# Patient Record
Sex: FEMALE | Race: BLACK OR AFRICAN AMERICAN | Employment: FULL TIME | URBAN - METROPOLITAN AREA
[De-identification: names, ages, dates, MRNs, and addresses within clinical notes are randomized per-mention and may not be internally consistent; named-entity substitution may affect disease eponyms.]

---

## 2017-10-07 ENCOUNTER — HOSPITAL ENCOUNTER (EMERGENCY)
Age: 47
Discharge: HOME | End: 2017-10-07
Attending: EMERGENCY MEDICINE | Admitting: EMERGENCY MEDICINE
Payer: COMMERCIAL

## 2017-10-07 VITALS
TEMPERATURE: 98.3 F | DIASTOLIC BLOOD PRESSURE: 86 MMHG | OXYGEN SATURATION: 99 % | WEIGHT: 180 LBS | SYSTOLIC BLOOD PRESSURE: 136 MMHG | HEART RATE: 70 BPM | RESPIRATION RATE: 20 BRPM

## 2017-10-07 DIAGNOSIS — S01.81XA FACIAL LACERATION, INITIAL ENCOUNTER: Primary | ICD-10-CM

## 2017-10-07 PROCEDURE — 99283 EMERGENCY DEPT VISIT LOW MDM: CPT

## 2017-10-07 PROCEDURE — 74011250637 HC RX REV CODE- 250/637: Performed by: PHYSICIAN ASSISTANT

## 2017-10-07 PROCEDURE — 77030018836 HC SOL IRR NACL ICUM -A

## 2017-10-07 PROCEDURE — 75810000293 HC SIMP/SUPERF WND  RPR

## 2017-10-07 PROCEDURE — 77030031132 HC SUT NYL COVD -A

## 2017-10-07 RX ORDER — MAGNESIUM 200 MG
1000 TABLET ORAL DAILY
COMMUNITY
End: 2019-02-07

## 2017-10-07 RX ORDER — IBUPROFEN 600 MG/1
600 TABLET ORAL
Status: COMPLETED | OUTPATIENT
Start: 2017-10-07 | End: 2017-10-07

## 2017-10-07 RX ADMIN — IBUPROFEN 600 MG: 600 TABLET ORAL at 22:33

## 2017-10-07 NOTE — LETTER
[facility] EMERGENCY DEPT 
[facility] access hospital[facility] [address] [phone] Work/School Note Date: 10/7/2017 To Whom It May concern: 
 
Anurag Goode was seen and treated today in the emergency room by the following provider(s): 
Attending Provider: Henna Andino MD 
Physician Assistant: Brooke Ramirez. Anurag Goode may return to work on 10/9/17. Sincerely, 
 
 
 
 
Brooke Ramirez

## 2017-10-07 NOTE — Clinical Note
Follow-up with your primary care physician in 3-5 days for reassessment and removal of sutures. Bring the results from this visit with your for their review.  Return to the ED for any new, worsening, or persistent symptoms

## 2017-10-08 NOTE — DISCHARGE INSTRUCTIONS
Cuts: Care Instructions  Your Care Instructions  A cut can happen anywhere on your body. Stitches, staples, skin adhesives, or pieces of tape called Steri-Strips are sometimes used to keep the edges of a cut together and help it heal. Steri-Strips can be used by themselves or with stitches or staples. Sometimes cuts are left open. If the cut went deep and through the skin, the doctor may have closed the cut in two layers. A deeper layer of stitches brings the deep part of the cut together. These stitches will dissolve and don't need to be removed. The upper layer closure, which could be stitches, staples, Steri-Strips, or adhesive, is what you see on the cut. A cut is often covered by a bandage. The doctor has checked you carefully, but problems can develop later. If you notice any problems or new symptoms, get medical treatment right away. Follow-up care is a key part of your treatment and safety. Be sure to make and go to all appointments, and call your doctor if you are having problems. It's also a good idea to know your test results and keep a list of the medicines you take. How can you care for yourself at home? If a cut is open or closed  · Prop up the sore area on a pillow anytime you sit or lie down during the next 3 days. Try to keep it above the level of your heart. This will help reduce swelling. · Keep the cut dry for the first 24 to 48 hours. After this, you can shower if your doctor okays it. Pat the cut dry. · Don't soak the cut, such as in a bathtub. Your doctor will tell you when it's safe to get the cut wet. · After the first 24 to 48 hours, clean the cut with soap and water 2 times a day unless your doctor gives you different instructions. ¨ Don't use hydrogen peroxide or alcohol, which can slow healing. ¨ You may cover the cut with a thin layer of petroleum jelly and a nonstick bandage.   ¨ If the doctor put a bandage over the cut, put on a new bandage after cleaning the cut or if the bandage gets wet or dirty. · Avoid any activity that could cause your cut to reopen. · Be safe with medicines. Read and follow all instructions on the label. ¨ If the doctor gave you a prescription medicine for pain, take it as prescribed. ¨ If you are not taking a prescription pain medicine, ask your doctor if you can take an over-the-counter medicine. If the cut is closed with stitches, staples, or Steri-Strips  · Follow the above instructions for open or closed cuts. · Do not remove the stitches or staples on your own. Your doctor will tell you when to come back to have the stitches or staples removed. · Leave Steri-Strips on until they fall off. If the cut is closed with a skin adhesive  · Follow the above instructions for open or closed cuts. · Leave the skin adhesive on your skin until it falls off on its own. This may take 5 to 10 days. · Do not scratch, rub, or pick at the adhesive. · Do not put the sticky part of a bandage directly on the adhesive. · Do not put any kind of ointment, cream, or lotion over the area. This can make the adhesive fall off too soon. Do not use hydrogen peroxide or alcohol, which can slow healing. When should you call for help? Call your doctor now or seek immediate medical care if:  · You have new pain, or your pain gets worse. · The skin near the cut is cold or pale or changes color. · You have tingling, weakness, or numbness near the cut. · The cut starts to bleed, and blood soaks through the bandage. Oozing small amounts of blood is normal.  · You have trouble moving the area near the cut. · You have symptoms of infection, such as:  ¨ Increased pain, swelling, warmth, or redness around the cut. ¨ Red streaks leading from the cut. ¨ Pus draining from the cut. ¨ A fever. Watch closely for changes in your health, and be sure to contact your doctor if:  · The cut reopens. · You do not get better as expected. Where can you learn more?   Go to http://nedra-susana.info/. Enter M735 in the search box to learn more about \"Cuts: Care Instructions. \"  Current as of: March 20, 2017  Content Version: 11.3  © 9827-0422 [employer]. Care instructions adapted under license by [employer] (which disclaims liability or warranty for this information). If you have questions about a medical condition or this instruction, always ask your healthcare professional. [place] [address] any warranty or liability for your use of this information.

## 2017-10-08 NOTE — ED PROVIDER NOTES
HPI Comments: 9:54 PM  52 y.o. female who presents to ED C/O R forehead laceration that occurred a few minutes PTA. Pt notes a coffee mug fell from the cabinet and hit her. Denies LOC, dizziness, n/v. Does not take any blood thinners. Tetanus shot UTD. Pt denies any other sxs or complaints. Written by Jacob Steve PA-C      The history is provided by the patient. Past Medical History:   Diagnosis Date    High cholesterol        Past Surgical History:   Procedure Laterality Date    HX CERVICAL DISKECTOMY      HX CYST REMOVAL      left hand    HX HERNIA REPAIR      HX HYSTERECTOMY      HX OTHER SURGICAL      eye stye removal         History reviewed. No pertinent family history. Social History     Social History    Marital status: SINGLE     Spouse name: N/A    Number of children: N/A    Years of education: N/A     Occupational History    Not on file. Social History Main Topics    Smoking status: Current Every Day Smoker    Smokeless tobacco: Not on file    Alcohol use Yes      Comment: occasional    Drug use: Not on file    Sexual activity: Not on file     Other Topics Concern    Not on file     Social History Narrative    No narrative on file         ALLERGIES: Wellbutrin [bupropion]    Review of Systems   Eyes: Negative for pain. Skin: Positive for wound (R eyebrow). Neurological: Negative for syncope, light-headedness and numbness. Hematological: Negative for adenopathy. Does not bruise/bleed easily. Vitals:    10/07/17 2206   BP: 136/86   Pulse: 70   Resp: 20   Temp: 98.3 °F (36.8 °C)   SpO2: 99%   Weight: 81.6 kg (180 lb)            Physical Exam   Constitutional: She appears well-developed and well-nourished. No distress. HENT:   Head: Normocephalic and atraumatic. Head is without raccoon's eyes, without Melchor's sign and without contusion. Right Ear: No hemotympanum. Left Ear: No hemotympanum. Neck: Normal range of motion. Neck supple. Cardiovascular: Normal rate, regular rhythm and normal heart sounds. Exam reveals no gallop and no friction rub. No murmur heard. Pulmonary/Chest: Breath sounds normal. No respiratory distress. She has no wheezes. She has no rales. Neurological: She is alert. She has normal strength. She is not disoriented. No cranial nerve deficit or sensory deficit. GCS eye subscore is 4. GCS verbal subscore is 5. GCS motor subscore is 6. Skin: Skin is warm. No rash noted. She is not diaphoretic. Nursing note and vitals reviewed. [facility]  ED Course       Wound Repair  Date/Time: 10/7/2017 10:01 PM  Performed by: [address] [employer] [facility] provider: Dr. Vani Disla  Preparation: skin prepped with Betadine  Pre-procedure re-eval: Immediately prior to the procedure, the patient was reevaluated and found suitable for the planned procedure and any planned medications. Time out: Immediately prior to the procedure a time out was called to verify the correct patient, procedure, equipment, staff and marking as appropriate. .  Location details: face  Wound length:2.5 cm or less  Anesthesia: local infiltration    Anesthesia:  Local Anesthetic: lidocaine 1% without epinephrine  Anesthetic total: 2 mL  Foreign bodies: no foreign bodies  Irrigation solution: saline  Irrigation method: syringe  Debridement: none  Skin closure: 6-0 nylon  Number of sutures: 6  Technique: simple  Approximation: close  Dressing: antibiotic ointment and 4x4  Patient tolerance: Patient tolerated the procedure well with no immediate complications  My total time at bedside, performing this procedure was 1-15 minutes. RESULTS:    No orders to display       Labs Reviewed - No data to display    No results found for this or any previous visit (from the past 12 hour(s)). IMPRESSION AND MEDICAL DECISION MAKING:  Head injury d/c: No S/S of fracture, intracranial bleeding, or neurologic deficit. Stable for d/c with outpatient follow-up.      CONDITION ON DISCHARGE:  stable    DISCHARGE NOTE:  10:28 PM  Jess Solomon's  results have been reviewed with her. She has been counseled regarding her diagnosis, treatment, and plan. She verbally conveys understanding and agreement of the signs, symptoms, diagnosis, treatment and prognosis and additionally agrees to follow up as discussed. She also agrees with the care-plan and conveys that all of her questions have been answered. I have also provided discharge instructions for her that include: educational information regarding their diagnosis and treatment, and list of reasons why they would want to return to the ED prior to their follow-up appointment, should her condition change. CLINICAL IMPRESSION:    1.  Facial laceration, initial encounter        AFTER VISIT PLAN:    Current Discharge Medication List           Follow-up Information     Follow up With Details Comments Contact Info    [facility] EMERGENCY DEPT  If symptoms worsen [address]    Sara Gómez MD In 4 days  [place]  [phone]             Written by Katharyn Sicard, PA-C

## 2018-09-25 ENCOUNTER — APPOINTMENT (OUTPATIENT)
Dept: CT IMAGING | Age: 48
End: 2018-09-25
Attending: EMERGENCY MEDICINE
Payer: COMMERCIAL

## 2018-09-25 ENCOUNTER — APPOINTMENT (OUTPATIENT)
Dept: GENERAL RADIOLOGY | Age: 48
End: 2018-09-25
Attending: PHYSICIAN ASSISTANT
Payer: COMMERCIAL

## 2018-09-25 ENCOUNTER — HOSPITAL ENCOUNTER (EMERGENCY)
Age: 48
Discharge: HOME | End: 2018-09-26
Attending: EMERGENCY MEDICINE
Payer: COMMERCIAL

## 2018-09-25 DIAGNOSIS — K85.90 ACUTE PANCREATITIS WITHOUT INFECTION OR NECROSIS, UNSPECIFIED PANCREATITIS TYPE: Primary | ICD-10-CM

## 2018-09-25 LAB
ALBUMIN SERPL-MCNC: 3.9 G/DL (ref 3.4–5)
ALBUMIN/GLOB SERPL: 1.2 {RATIO} (ref 0.8–1.7)
ALP SERPL-CCNC: 74 U/L (ref 45–117)
ALT SERPL-CCNC: 18 U/L (ref 13–56)
ANION GAP SERPL CALC-SCNC: 9 MMOL/L (ref 3–18)
AST SERPL-CCNC: 12 U/L (ref 15–37)
BASOPHILS # BLD: 0 K/UL (ref 0–0.1)
BASOPHILS NFR BLD: 0 % (ref 0–2)
BILIRUB DIRECT SERPL-MCNC: 0.1 MG/DL (ref 0–0.2)
BILIRUB SERPL-MCNC: 0.6 MG/DL (ref 0.2–1)
BUN SERPL-MCNC: 16 MG/DL (ref 7–18)
BUN/CREAT SERPL: 15 (ref 12–20)
CALCIUM SERPL-MCNC: 8.8 MG/DL (ref 8.5–10.1)
CHLORIDE SERPL-SCNC: 104 MMOL/L (ref 100–108)
CK MB CFR SERPL CALC: NORMAL % (ref 0–4)
CK MB SERPL-MCNC: <1 NG/ML (ref 5–25)
CK SERPL-CCNC: 119 U/L (ref 26–192)
CO2 SERPL-SCNC: 27 MMOL/L (ref 21–32)
CREAT SERPL-MCNC: 1.06 MG/DL (ref 0.6–1.3)
D DIMER PPP FEU-MCNC: 1.73 UG/ML(FEU)
DIFFERENTIAL METHOD BLD: ABNORMAL
EOSINOPHIL # BLD: 0.3 K/UL (ref 0–0.4)
EOSINOPHIL NFR BLD: 3 % (ref 0–5)
ERYTHROCYTE [DISTWIDTH] IN BLOOD BY AUTOMATED COUNT: 13.5 % (ref 11.6–14.5)
GLOBULIN SER CALC-MCNC: 3.2 G/DL (ref 2–4)
GLUCOSE SERPL-MCNC: 103 MG/DL (ref 74–99)
HCT VFR BLD AUTO: 34 % (ref 35–45)
HGB BLD-MCNC: 11.5 G/DL (ref 12–16)
LIPASE SERPL-CCNC: 5351 U/L (ref 73–393)
LYMPHOCYTES # BLD: 3.5 K/UL (ref 0.9–3.6)
LYMPHOCYTES NFR BLD: 43 % (ref 21–52)
MCH RBC QN AUTO: 30.7 PG (ref 24–34)
MCHC RBC AUTO-ENTMCNC: 33.8 G/DL (ref 31–37)
MCV RBC AUTO: 90.7 FL (ref 74–97)
MONOCYTES # BLD: 0.4 K/UL (ref 0.05–1.2)
MONOCYTES NFR BLD: 5 % (ref 3–10)
NEUTS SEG # BLD: 4 K/UL (ref 1.8–8)
NEUTS SEG NFR BLD: 49 % (ref 40–73)
PLATELET # BLD AUTO: 179 K/UL (ref 135–420)
PMV BLD AUTO: 9.9 FL (ref 9.2–11.8)
POTASSIUM SERPL-SCNC: 3.3 MMOL/L (ref 3.5–5.5)
PROT SERPL-MCNC: 7.1 G/DL (ref 6.4–8.2)
RBC # BLD AUTO: 3.75 M/UL (ref 4.2–5.3)
SODIUM SERPL-SCNC: 140 MMOL/L (ref 136–145)
TROPONIN I SERPL-MCNC: <0.02 NG/ML (ref 0–0.06)
WBC # BLD AUTO: 8.2 K/UL (ref 4.6–13.2)

## 2018-09-25 PROCEDURE — 99284 EMERGENCY DEPT VISIT MOD MDM: CPT

## 2018-09-25 PROCEDURE — 83690 ASSAY OF LIPASE: CPT | Performed by: EMERGENCY MEDICINE

## 2018-09-25 PROCEDURE — 74011250636 HC RX REV CODE- 250/636: Performed by: EMERGENCY MEDICINE

## 2018-09-25 PROCEDURE — 80076 HEPATIC FUNCTION PANEL: CPT | Performed by: EMERGENCY MEDICINE

## 2018-09-25 PROCEDURE — 82553 CREATINE MB FRACTION: CPT

## 2018-09-25 PROCEDURE — 74177 CT ABD & PELVIS W/CONTRAST: CPT

## 2018-09-25 PROCEDURE — 71046 X-RAY EXAM CHEST 2 VIEWS: CPT

## 2018-09-25 PROCEDURE — 85025 COMPLETE CBC W/AUTO DIFF WBC: CPT

## 2018-09-25 PROCEDURE — 74011250637 HC RX REV CODE- 250/637: Performed by: EMERGENCY MEDICINE

## 2018-09-25 PROCEDURE — 74011636320 HC RX REV CODE- 636/320: Performed by: EMERGENCY MEDICINE

## 2018-09-25 PROCEDURE — 93005 ELECTROCARDIOGRAM TRACING: CPT

## 2018-09-25 PROCEDURE — 96374 THER/PROPH/DIAG INJ IV PUSH: CPT

## 2018-09-25 PROCEDURE — 96375 TX/PRO/DX INJ NEW DRUG ADDON: CPT

## 2018-09-25 PROCEDURE — 96361 HYDRATE IV INFUSION ADD-ON: CPT

## 2018-09-25 PROCEDURE — 85379 FIBRIN DEGRADATION QUANT: CPT | Performed by: EMERGENCY MEDICINE

## 2018-09-25 PROCEDURE — 71275 CT ANGIOGRAPHY CHEST: CPT

## 2018-09-25 PROCEDURE — 80048 BASIC METABOLIC PNL TOTAL CA: CPT

## 2018-09-25 RX ORDER — MORPHINE SULFATE 4 MG/ML
4 INJECTION, SOLUTION INTRAMUSCULAR; INTRAVENOUS
Status: COMPLETED | OUTPATIENT
Start: 2018-09-25 | End: 2018-09-25

## 2018-09-25 RX ORDER — ONDANSETRON 2 MG/ML
4 INJECTION INTRAMUSCULAR; INTRAVENOUS
Status: COMPLETED | OUTPATIENT
Start: 2018-09-25 | End: 2018-09-25

## 2018-09-25 RX ORDER — FAMOTIDINE 10 MG/ML
20 INJECTION INTRAVENOUS
Status: COMPLETED | OUTPATIENT
Start: 2018-09-25 | End: 2018-09-25

## 2018-09-25 RX ADMIN — MORPHINE SULFATE 4 MG: 4 INJECTION, SOLUTION INTRAMUSCULAR; INTRAVENOUS at 22:46

## 2018-09-25 RX ADMIN — FAMOTIDINE 20 MG: 10 INJECTION, SOLUTION INTRAVENOUS at 21:11

## 2018-09-25 RX ADMIN — ONDANSETRON 4 MG: 2 INJECTION INTRAMUSCULAR; INTRAVENOUS at 22:45

## 2018-09-25 RX ADMIN — SODIUM CHLORIDE 1000 ML: 900 INJECTION, SOLUTION INTRAVENOUS at 22:45

## 2018-09-25 RX ADMIN — IOPAMIDOL 100 ML: 755 INJECTION, SOLUTION INTRAVENOUS at 23:35

## 2018-09-25 RX ADMIN — ALUMINUM HYDROXIDE AND MAGNESIUM HYDROXIDE 30 ML: 200; 200 SUSPENSION ORAL at 21:11

## 2018-09-26 VITALS
SYSTOLIC BLOOD PRESSURE: 116 MMHG | WEIGHT: 170 LBS | OXYGEN SATURATION: 97 % | HEART RATE: 67 BPM | HEIGHT: 65 IN | TEMPERATURE: 98.1 F | DIASTOLIC BLOOD PRESSURE: 66 MMHG | BODY MASS INDEX: 28.32 KG/M2 | RESPIRATION RATE: 20 BRPM

## 2018-09-26 RX ORDER — ONDANSETRON 4 MG/1
4 TABLET, ORALLY DISINTEGRATING ORAL
Qty: 10 TAB | Refills: 0 | Status: SHIPPED | OUTPATIENT
Start: 2018-09-26

## 2018-09-26 RX ORDER — OXYCODONE HYDROCHLORIDE 5 MG/1
5 TABLET ORAL
Qty: 6 TAB | Refills: 0 | Status: SHIPPED | OUTPATIENT
Start: 2018-09-26 | End: 2019-02-07

## 2018-09-26 NOTE — ED NOTES
Both written and verbal discharge instructions given to pt with verbalized understanding of home care and follow up. Prescription given. Pt has a ride home with her son.

## 2018-09-26 NOTE — ED NOTES
Pt back from CT scan, feeling better after pain medication. Lights dimmed, blankets given. Resting while waiting for CT results.

## 2018-09-26 NOTE — DISCHARGE INSTRUCTIONS
CLEAR LIQUID DIET FOR 24 HOURS, THEN YOU MAY ADVANCE TO LOW FAT DIET BLAND DIET (SEE BELOW). ABSOLUTELY NO ALCOHOL. FOR PAIN TAKE TYLENOL (ACETAMINOPHEN) 1000 MG (USUALLY 2 EXTRA STRENGTH TABLETS) EVERY 6 HOURS AS NEEDED. IF PAIN STILL DOES NOT GO AWAY, THEN TAKE ONE OF THE PRESCRIBED OXYCODONE PILLS. FOLLOW-UP WITH THE GI DOCTOR. IF YOU HAVE NEW OR WORSENING SYMPTOMS, SEVERE PAIN, VOMITING, FEVER, PASSING OUT OR ANY OTHER WORRYING SIGNS THEN RETURN TO THE ER RIGHT AWAY. AS WE DISCUSSED, MANY PEOPLE NEED TO BE ADMITTED TO THE HOSPITAL FOR THIS CONDITION. AS WE DISCUSSED, THERE WERE ABNORMAL FINDINGS ON YOUR CT SCAN. YOU WERE GIVEN A COPY OF THE RESULTS. PLEASE BRING THESE TO YOUR DOCTOR TO DETERMINE WHAT FURTHER TESTING YOU MAY NEED. Pancreatitis: Care Instructions  Your Care Instructions    The pancreas is an organ behind the stomach. It makes hormones and enzymes to help your body digest food. But if these enzymes attack the pancreas, it can get inflamed. This is called pancreatitis. Most cases are caused by gallstones or by heavy alcohol use. If you take care of yourself at home, it will help you get better. It will also help you avoid more problems with your pancreas. Follow-up care is a key part of your treatment and safety. Be sure to make and go to all appointments, and call your doctor if you are having problems. It's also a good idea to know your test results and keep a list of the medicines you take. How can you care for yourself at home? · Drink clear liquids and eat bland foods until you feel better. Rock foods include rice, dry toast, and crackers. They also include bananas and applesauce. · Eat a low-fat diet until your doctor says your pancreas is healed. · Do not drink alcohol. Tell your doctor if you need help to quit. Counseling, support groups, and sometimes medicines can help you stay sober. · Be safe with medicines.  Read and follow all instructions on the label.  ¨ If the doctor gave you a prescription medicine for pain, take it as prescribed. ¨ If you are not taking a prescription pain medicine, ask your doctor if you can take an over-the-counter medicine. · If your doctor prescribed antibiotics, take them as directed. Do not stop taking them just because you feel better. You need to take the full course of antibiotics. · Get extra rest until you feel better. To prevent future problems with your pancreas  · Do not drink alcohol. · Tell your doctors and pharmacist that you've had pancreatitis. They can help you avoid medicines that may cause this problem again. When should you call for help? Call 911 anytime you think you may need emergency care. For example, call if:    · You vomit blood or what looks like coffee grounds.     · Your stools are maroon or very bloody.    Call your doctor now or seek immediate medical care if:    · You have new or worse belly pain.     · Your stools are black and look like tar, or they have streaks of blood.     · You are vomiting.    Watch closely for changes in your health, and be sure to contact your doctor if:    · You do not get better as expected. Where can you learn more? Go to http://nedra-susana.info/. Enter A239 in the search box to learn more about \"Pancreatitis: Care Instructions. \"  Current as of: May 12, 2017  Content Version: 11.7  © 0282-8192 [employer]. Care instructions adapted under license by [employer] (which disclaims liability or warranty for this information). If you have questions about a medical condition or this instruction, always ask your healthcare professional. [place] [address] any warranty or liability for your use of this information.

## 2018-09-26 NOTE — ED PROVIDER NOTES
EMERGENCY DEPARTMENT HISTORY AND PHYSICAL EXAM 
 
8:30 PM 
 
 
Date: 9/25/2018 Patient Name: Emma Steel History of Presenting Illness Chief Complaint Patient presents with  Chest Pain History Provided By: Patient Chief Complaint: Chest pain Duration:  10 hours Timing:  Acute Location: Under left arm, radiating to under left breast 
Quality: Dorsandroe Bue Severity: Moderate Modifying Factors: Experiences pain when breathing. Associated Symptoms: shortness of breath Additional History (Context): Emma Steel is a 50 y.o. female with past medical Hx of asthma and high cholesterol who presents with acute, sharp upper abdominal and lower chest pain of moderate severity with an onset of 10 hours ago. Patient says that pain starts under left arm and radiates to under her left breast. When pain began, patient says she was sitting in her office where she is a [employer] . Associated symptoms include shortness of breath. She says that she experiences pain when breathing. She denies any regular ETOH intake and has not had these symptoms in the past.  She has h/o asthma and HLD, but is not on any statins. No associated fever, vomiting, urinary sx, leg pain/swelling, diarrhea, or constipation. PCP: Bruce Michael MD 
 
Current Facility-Administered Medications Medication Dose Route Frequency Provider Last Rate Last Dose  sodium chloride 0.9 % bolus infusion 1,000 mL  1,000 mL IntraVENous ONCE Doug Avila MD      
 
Current Outpatient Prescriptions Medication Sig Dispense Refill  cyanocobalamin (VITAMIN B-12) 1,000 mcg sublingual tablet Take 1,000 mcg by mouth daily. Past History Past Medical History: 
Past Medical History:  
Diagnosis Date  Asthma  High cholesterol Past Surgical History: 
Past Surgical History:  
Procedure Laterality Date  HX CERVICAL DISKECTOMY  HX CYST REMOVAL    
 left hand  HX HERNIA REPAIR    
  HX HYSTERECTOMY  HX OTHER SURGICAL    
 eye stye removal  
 
 
Family History: 
History reviewed. No pertinent family history. Social History: 
Social History Substance Use Topics  Smoking status: Current Every Day Smoker  Smokeless tobacco: Never Used  Alcohol use Yes Comment: occasional  
 
 
Allergies: Allergies Allergen Reactions  Bupropion Hcl Rash  Wellbutrin [Bupropion] Rash and Itching Review of Systems Review of Systems Constitutional: Negative for chills, fatigue and fever. HENT: Negative for congestion, ear pain, rhinorrhea and sore throat. Eyes: Negative for pain, redness and itching. Respiratory: Positive for shortness of breath. Negative for cough and chest tightness. Cardiovascular: Positive for chest pain. Negative for palpitations and leg swelling. Gastrointestinal: Negative for abdominal pain, diarrhea, nausea and vomiting. Genitourinary: Negative for decreased urine volume, dysuria, flank pain, hematuria and pelvic pain. Musculoskeletal: Negative for arthralgias, back pain, joint swelling and myalgias. Skin: Negative for color change, pallor and rash. Neurological: Negative for dizziness, weakness and headaches. Hematological: Negative for adenopathy. Does not bruise/bleed easily. All other systems reviewed and are negative. Physical Exam  
 
Visit Vitals  /66  Pulse 63  Temp 98.1 °F (36.7 °C)  Resp 14  
 Ht 5' 5\" (1.651 m)  Wt 77.1 kg (170 lb)  SpO2 99%  BMI 28.29 kg/m2 Physical Exam  
Constitutional: No distress. HENT:  
Head: Normocephalic and atraumatic. Mouth/Throat: Oropharynx is clear and moist.  
Eyes: Conjunctivae and EOM are normal. Pupils are equal, round, and reactive to light. Neck: Normal range of motion. Neck supple. Cardiovascular: Normal rate, regular rhythm and normal heart sounds. No murmur heard. Pulmonary/Chest: Effort normal and breath sounds normal. She has no wheezes. She has no rales. Abdominal: Soft. Bowel sounds are normal. She exhibits no distension and no mass. There is tenderness in the epigastric area and left upper quadrant. There is no rigidity, no rebound and no guarding. No mass or bruit Musculoskeletal: Normal range of motion. She exhibits no edema or deformity. Lymphadenopathy:  
  She has no cervical adenopathy. Neurological: She is alert. She exhibits normal muscle tone. Coordination normal.  
Skin: Skin is warm and dry. No rash noted. She is not diaphoretic. No erythema. Psychiatric: She has a normal mood and affect. Her behavior is normal.  
 
 
 
Diagnostic Study Results Labs - Recent Results (from the past 12 hour(s)) CBC WITH AUTOMATED DIFF Collection Time: 09/25/18  8:20 PM  
Result Value Ref Range WBC 8.2 4.6 - 13.2 K/uL  
 RBC 3.75 (L) 4.20 - 5.30 M/uL  
 HGB 11.5 (L) 12.0 - 16.0 g/dL HCT 34.0 (L) 35.0 - 45.0 % MCV 90.7 74.0 - 97.0 FL  
 MCH 30.7 24.0 - 34.0 PG  
 MCHC 33.8 31.0 - 37.0 g/dL  
 RDW 13.5 11.6 - 14.5 % PLATELET 683 204 - 480 K/uL MPV 9.9 9.2 - 11.8 FL  
 NEUTROPHILS 49 40 - 73 % LYMPHOCYTES 43 21 - 52 % MONOCYTES 5 3 - 10 % EOSINOPHILS 3 0 - 5 % BASOPHILS 0 0 - 2 %  
 ABS. NEUTROPHILS 4.0 1.8 - 8.0 K/UL  
 ABS. LYMPHOCYTES 3.5 0.9 - 3.6 K/UL  
 ABS. MONOCYTES 0.4 0.05 - 1.2 K/UL  
 ABS. EOSINOPHILS 0.3 0.0 - 0.4 K/UL  
 ABS. BASOPHILS 0.0 0.0 - 0.1 K/UL  
 DF AUTOMATED METABOLIC PANEL, BASIC Collection Time: 09/25/18  8:20 PM  
Result Value Ref Range Sodium 140 136 - 145 mmol/L Potassium 3.3 (L) 3.5 - 5.5 mmol/L Chloride 104 100 - 108 mmol/L  
 CO2 27 21 - 32 mmol/L Anion gap 9 3.0 - 18 mmol/L Glucose 103 (H) 74 - 99 mg/dL BUN 16 7.0 - 18 MG/DL Creatinine 1.06 0.6 - 1.3 MG/DL  
 BUN/Creatinine ratio 15 12 - 20 GFR est AA >60 >60 ml/min/1.73m2 GFR est non-AA 55 (L) >60 ml/min/1.73m2 Calcium 8.8 8.5 - 10.1 MG/DL  
CARDIAC PANEL,(CK, CKMB & TROPONIN) Collection Time: 09/25/18  8:20 PM  
Result Value Ref Range  26 - 192 U/L  
 CK - MB <1.0 <3.6 ng/ml CK-MB Index  0.0 - 4.0 % CALCULATION NOT PERFORMED WHEN RESULT IS BELOW LINEAR LIMIT Troponin-I, Qt. <0.02 0.00 - 0.06 NG/ML  
D DIMER Collection Time: 09/25/18  8:20 PM  
Result Value Ref Range D DIMER 1.73 (H) <0.46 ug/ml(FEU) LIPASE Collection Time: 09/25/18  8:20 PM  
Result Value Ref Range Lipase 5351 (H) 73 - 393 U/L  
HEPATIC FUNCTION PANEL Collection Time: 09/25/18  8:20 PM  
Result Value Ref Range Protein, total 7.1 6.4 - 8.2 g/dL Albumin 3.9 3.4 - 5.0 g/dL Globulin 3.2 2.0 - 4.0 g/dL A-G Ratio 1.2 0.8 - 1.7 Bilirubin, total 0.6 0.2 - 1.0 MG/DL Bilirubin, direct 0.1 0.0 - 0.2 MG/DL Alk. phosphatase 74 45 - 117 U/L  
 AST (SGOT) 12 (L) 15 - 37 U/L  
 ALT (SGPT) 18 13 - 56 U/L Radiologic Studies -  
CT ABD PELV W CONT Final Result IMPRESSION:  
1. Moderate fat stranding around the pancreatic body and tail without discrete  
drainable collection. Correlate with lipase for possible pancreatitis. 2.  Nonspecific 13 mm right hepatic lobe lesion measuring 67 Hounsfield units. Recommend further characterization with ultrasound. CTA CHEST W OR W WO CONT Final Result IMPRESSION:  
1. No pulmonary artery filling defect to suggest pulmonary embolus. 2.  Focal narrowing of the proximal celiac trunk as it passes under the left  
hemidiaphragm syd. Correlate for symptoms of median arcuate ligament syndrome. 3.  Centrilobular emphysematous changes and biapical subpleural blebs. 4.  Multiple shotty mediastinal nodes and a 13 mm right hilar lymph node,  
nonspecific. XR CHEST PA LAT Final Result IMPRESSION:  
  
No acute finding Medical Decision Making I am the first provider for this patient. I reviewed the vital signs, available nursing notes, past medical history, past surgical history, family history and social history. Vital Signs-Reviewed the patient's vital signs. Pulse Oximetry Analysis -  100% on room air (Normal) EKG: Interpreted by the EP. Time Interpreted: 20:11 Rate: 75 bpm 
 Rhythm: Normal Sinus Rhythm Interpretation: No acute ST changes Records Reviewed: Nursing Notes (Time of Review: 8:30 PM) ED Course: Progress Notes, Reevaluation, and Consults: 
 
1:53 AM Patient's work up is consistent with acute uncomplicated pancreatitis. She is non-toxic appearing with unremarkable labs. No fever or leukocytosis. She has had persistent pain, but is tolerating PO clears. CT does not show any secondary complications or biliary pathology. I advised patient that I recommend hospital admission for acute pancreatitis, but she declines. States she would rather try dietary mgmt and analgesics at home and that she will call gastroenterology and her PCP to arrange follow-up in the office. She understands that pancreatitis can be a serious and even life threatening disease in some instances and agrees that she will return to the hospital if her symptoms get worse or she develops fever or other worrying signs. Son was at bedside as well for this conversation 2:00 AM  I have discussed incidental CT findings with the patient. She does not have any clinical features that would suggest median arcuate ligament syndrome. Denies any chronic or postprandial abd pain or any unintended weight loss. Advised her of incidental nodules. She is provided a copy of her CT reports and told to bring them to her primary care doctor to guide further outpatient evaluation as needed. Diagnosis Clinical Impression: 1. Acute pancreatitis without infection or necrosis, unspecified pancreatitis type Disposition: Discharge Follow-up Information None Patient's Medications Start Taking No medications on file Continue Taking CYANOCOBALAMIN (VITAMIN B-12) 1,000 MCG SUBLINGUAL TABLET    Take 1,000 mcg by mouth daily. These Medications have changed No medications on file Stop Taking No medications on file  
 
_______________________________ Attestations: 
Scribe Attestation China Fraire acting as a scribe for and in the presence of Mani Dawson MD     
September 25, 2018 at 8:30 PM 
    
Provider Attestation:     
I personally performed the services described in the documentation, reviewed the documentation, as recorded by the scribe in my presence, and it accurately and completely records my words and actions. September 25, 2018 at 8:30 PM - Mani Dawson MD   
_______________________________

## 2018-12-04 ENCOUNTER — HOSPITAL ENCOUNTER (EMERGENCY)
Age: 48
Discharge: HOME | End: 2018-12-04
Attending: EMERGENCY MEDICINE
Payer: COMMERCIAL

## 2018-12-04 ENCOUNTER — APPOINTMENT (OUTPATIENT)
Dept: GENERAL RADIOLOGY | Age: 48
End: 2018-12-04
Attending: PHYSICIAN ASSISTANT
Payer: COMMERCIAL

## 2018-12-04 VITALS
RESPIRATION RATE: 16 BRPM | WEIGHT: 169 LBS | DIASTOLIC BLOOD PRESSURE: 68 MMHG | TEMPERATURE: 98.6 F | OXYGEN SATURATION: 99 % | SYSTOLIC BLOOD PRESSURE: 120 MMHG | BODY MASS INDEX: 28.12 KG/M2 | HEART RATE: 80 BPM

## 2018-12-04 DIAGNOSIS — M79.672 LEFT FOOT PAIN: Primary | ICD-10-CM

## 2018-12-04 PROCEDURE — 73630 X-RAY EXAM OF FOOT: CPT

## 2018-12-04 PROCEDURE — 99282 EMERGENCY DEPT VISIT SF MDM: CPT

## 2018-12-04 RX ORDER — NAPROXEN 500 MG/1
500 TABLET ORAL 2 TIMES DAILY WITH MEALS
Qty: 20 TAB | Refills: 0 | Status: SHIPPED | OUTPATIENT
Start: 2018-12-04 | End: 2019-02-07

## 2018-12-05 NOTE — ED TRIAGE NOTES
Left ankle/foot pain for several days, mostly pain begins on the bottom of the foot and radiates to the ankle. Denies trauma.

## 2018-12-05 NOTE — ED PROVIDER NOTES
EMERGENCY DEPARTMENT HISTORY AND PHYSICAL EXAM 
 
Date: 12/4/2018 Patient Name: Donavan Woodall History of Presenting Illness Chief Complaint Patient presents with  Ankle Pain History Provided By: patient Chief Complaint:foot pain Duration: 2-3 days Timing: acute Location throbbing Severity:moderate Modifying Factors: worse with walking Associated Symptoms: none Additional History (Context): Donavan Woodall is a 50 y.o. female with PMH asthma and high cholesterol who presents with complaints of pain along the bottom of the L foot x 2-3 days. Denies any recent injury. Pain is worse with walking. No other complaints. PCP: Sonya Rollins MD 
 
Current Outpatient Medications Medication Sig Dispense Refill  ondansetron (ZOFRAN ODT) 4 mg disintegrating tablet Take 1 Tab by mouth every eight (8) hours as needed for Nausea. 10 Tab 0  
 oxyCODONE IR (ROXICODONE) 5 mg immediate release tablet Take 1 Tab by mouth every six (6) hours as needed for Pain (Severe pain). Max Daily Amount: 20 mg. 6 Tab 0  
 cyanocobalamin (VITAMIN B-12) 1,000 mcg sublingual tablet Take 1,000 mcg by mouth daily. Past History Past Medical History: 
Past Medical History:  
Diagnosis Date  Asthma  High cholesterol Past Surgical History: 
Past Surgical History:  
Procedure Laterality Date  HX CERVICAL DISKECTOMY  HX CYST REMOVAL    
 left hand  HX HERNIA REPAIR    
 HX HYSTERECTOMY  HX OTHER SURGICAL    
 eye stye removal  
 
 
Family History: No family history on file. Social History: 
Social History Tobacco Use  Smoking status: Current Every Day Smoker  Smokeless tobacco: Never Used Substance Use Topics  Alcohol use: Yes Comment: occasional  
 Drug use: Not on file Allergies: Allergies Allergen Reactions  Bupropion Hcl Rash  Wellbutrin [Bupropion] Rash and Itching Review of Systems Review of Systems Constitutional: Negative. Negative for chills and fever. HENT: Negative. Negative for congestion, ear pain and rhinorrhea. Eyes: Negative. Negative for pain and redness. Respiratory: Negative. Negative for cough, shortness of breath, wheezing and stridor. Cardiovascular: Negative. Negative for chest pain and leg swelling. Gastrointestinal: Negative. Negative for abdominal pain, constipation, diarrhea, nausea and vomiting. Genitourinary: Negative. Negative for dysuria and frequency. Musculoskeletal: Positive for arthralgias. Negative for back pain and neck pain. Skin: Negative. Negative for rash and wound. Neurological: Negative. Negative for dizziness, seizures, syncope and headaches. All other systems reviewed and are negative. All Other Systems Negative Physical Exam  
 
Vitals:  
 12/04/18 2015 BP: 120/68 Pulse: 80 Resp: 16 Temp: 98.6 °F (37 °C) SpO2: 99% Weight: 76.7 kg (169 lb) Physical Exam  
Constitutional: She is oriented to person, place, and time. She appears well-developed and well-nourished. No distress. HENT:  
Head: Normocephalic and atraumatic. Eyes: Conjunctivae are normal. Right eye exhibits no discharge. Left eye exhibits no discharge. No scleral icterus. Neck: Normal range of motion. Neck supple. Cardiovascular: Normal rate. Pulmonary/Chest: Effort normal. No stridor. No respiratory distress. Musculoskeletal: Normal range of motion. She exhibits tenderness. She exhibits no edema or deformity. LLE: intact distal pulses, ROM intact, no obvious deformity noted. TTP elicited along the plantar surface of the foot and along the 4th/5th metatarsals Neurological: She is alert and oriented to person, place, and time. Coordination normal.  
Gait is steady. Able to ambulate without difficulty. Skin: Skin is warm and dry. No rash noted. She is not diaphoretic. No erythema. Psychiatric: She has a normal mood and affect. Her behavior is normal. Thought content normal.  
Nursing note and vitals reviewed. Diagnostic Study Results Labs - No results found for this or any previous visit (from the past 12 hour(s)). Radiologic Studies -  
XR FOOT LT MIN 3 V    (Results Pending) DJD without evidence of acute process CT Results  (Last 48 hours) None CXR Results  (Last 48 hours) None Medical Decision Making I am the first provider for this patient. I reviewed the vital signs, available nursing notes, past medical history, past surgical history, family history and social history. Vital Signs-Reviewed the patient's vital signs. Records Reviewed: Belgica Kimble PA-C 8:18 PM  
 
Procedures: 
Procedures Provider Notes (Medical Decision Making): Impression:  Foot pain, plantar fasciitis MED RECONCILIATION: 
No current facility-administered medications for this encounter. Current Outpatient Medications Medication Sig  
 ondansetron (ZOFRAN ODT) 4 mg disintegrating tablet Take 1 Tab by mouth every eight (8) hours as needed for Nausea.  oxyCODONE IR (ROXICODONE) 5 mg immediate release tablet Take 1 Tab by mouth every six (6) hours as needed for Pain (Severe pain). Max Daily Amount: 20 mg.  
 cyanocobalamin (VITAMIN B-12) 1,000 mcg sublingual tablet Take 1,000 mcg by mouth daily. Disposition: D/c 
 
DISCHARGE NOTE:  
Patient is stable for discharge at this time. Rx for naproxen given. Rest and follow-up with PCP this week. Return to the ED immediately for any new or worsening sx. Belgica Kimble PA-C 8:47 PM  
 
 
Diagnosis Clinical Impression: foot pain plantar fasciitis

## 2018-12-05 NOTE — DISCHARGE INSTRUCTIONS
Scout Activation    Thank you for requesting access to Scout. Please follow the instructions below to securely access and download your online medical record. Scout allows you to send messages to your doctor, view your test results, renew your prescriptions, schedule appointments, and more. How Do I Sign Up? 1. In your internet browser, go to [email or web address]  2. Click on the First Time User? Click Here link in the Sign In box. You will be redirect to the New Member Sign Up page. 3. Enter your Scout Access Code exactly as it appears below. You will not need to use this code after youve completed the sign-up process. If you do not sign up before the expiration date, you must request a new code. Scout Access Code: [email or web address]  Expires: 2018  7:17 PM (This is the date your Scout access code will )    4. Enter the last four digits of your Social Security Number (xxxx) and Date of Birth (mm/dd/yyyy) as indicated and click Submit. You will be taken to the next sign-up page. 5. Create a Scout ID. This will be your Scout login ID and cannot be changed, so think of one that is secure and easy to remember. 6. Create a Scout password. You can change your password at any time. 7. Enter your Password Reset Question and Answer. This can be used at a later time if you forget your password. 8. Enter your e-mail address. You will receive e-mail notification when new information is available in [address]. 9. Click Sign Up. You can now view and download portions of your medical record. 10. Click the Download Summary menu link to download a portable copy of your medical information. Additional Information    If you have questions, please visit the Frequently Asked Questions section of the Scout website at [email or web address] [employer] ([email or web address] Remember, Scout is NOT to be used for urgent needs. For medical emergencies, dial 911.      Complete all medications as prescribed. Follow-up with primary care doctor in 1 week. Return to the ED immediately for any new or worsening symptoms.

## 2018-12-05 NOTE — ED NOTES
Levon Dempsey is a 50 y.o. female that was discharged in stable. Pt was accompanied by self. Pt is driving. The patients diagnosis, condition and treatment were explained to  patient and aftercare instructions were given. The patient verbalized understanding. Patient armband removed and shredded.

## 2019-02-07 ENCOUNTER — APPOINTMENT (OUTPATIENT)
Dept: GENERAL RADIOLOGY | Age: 49
End: 2019-02-07
Attending: PHYSICIAN ASSISTANT
Payer: COMMERCIAL

## 2019-02-07 ENCOUNTER — HOSPITAL ENCOUNTER (EMERGENCY)
Age: 49
Discharge: HOME | End: 2019-02-07
Attending: EMERGENCY MEDICINE
Payer: COMMERCIAL

## 2019-02-07 VITALS
BODY MASS INDEX: 27.16 KG/M2 | RESPIRATION RATE: 16 BRPM | HEART RATE: 67 BPM | TEMPERATURE: 98.6 F | DIASTOLIC BLOOD PRESSURE: 65 MMHG | OXYGEN SATURATION: 100 % | SYSTOLIC BLOOD PRESSURE: 111 MMHG | WEIGHT: 169 LBS | HEIGHT: 66 IN

## 2019-02-07 DIAGNOSIS — J30.9 ALLERGIC RHINITIS, UNSPECIFIED SEASONALITY, UNSPECIFIED TRIGGER: ICD-10-CM

## 2019-02-07 DIAGNOSIS — H65.192 OTHER ACUTE NONSUPPURATIVE OTITIS MEDIA OF LEFT EAR, RECURRENCE NOT SPECIFIED: Primary | ICD-10-CM

## 2019-02-07 DIAGNOSIS — R07.89 MUSCULOSKELETAL CHEST PAIN: ICD-10-CM

## 2019-02-07 PROCEDURE — 71046 X-RAY EXAM CHEST 2 VIEWS: CPT

## 2019-02-07 PROCEDURE — 99281 EMR DPT VST MAYX REQ PHY/QHP: CPT

## 2019-02-07 RX ORDER — FLUTICASONE PROPIONATE 50 MCG
2 SPRAY, SUSPENSION (ML) NASAL DAILY
Qty: 1 BOTTLE | Refills: 0 | Status: SHIPPED | OUTPATIENT
Start: 2019-02-07

## 2019-02-07 RX ORDER — OXYMETAZOLINE HCL 0.05 %
2 SPRAY, NON-AEROSOL (ML) NASAL 2 TIMES DAILY
Qty: 5 ML | Refills: 0 | Status: SHIPPED | OUTPATIENT
Start: 2019-02-07 | End: 2019-02-10

## 2019-02-07 RX ORDER — AMOXICILLIN 500 MG/1
500 TABLET, FILM COATED ORAL 3 TIMES DAILY
Qty: 30 TAB | Refills: 0 | Status: SHIPPED | OUTPATIENT
Start: 2019-02-07 | End: 2019-02-17

## 2019-02-07 RX ORDER — BENZONATATE 100 MG/1
100 CAPSULE ORAL
Qty: 30 CAP | Refills: 0 | Status: SHIPPED | OUTPATIENT
Start: 2019-02-07 | End: 2019-02-14

## 2019-02-07 NOTE — ED PROVIDER NOTES
EMERGENCY DEPARTMENT HISTORY AND PHYSICAL EXAM 
 
2:31 PM 
 
 
Date: 2/7/2019 Patient Name: Darra Duverney History of Presenting Illness Chief Complaint Patient presents with  Cough  Rib Pain History Provided By: Patient Chief Complaint: cough, congestion, body aches, chest wall pain with coughing Duration: 2 Weeks Timing:  Acute Location:  
Quality: Aching Severity: 9 out of 10 Modifying Factors: no improvement with OTC meds Associated Symptoms: denies any other associated signs or symptoms Additional History (Context): Darra Duverney is a 50 y.o. female with a pertinent history of HLD, asthma who presents to the emergency department for evaluation of cough, body aches, sinus congestion, and chest wall pain on left with coughing. Pt reports symptoms x 2 weeks. She was seen for possible sinusitis at urgent care over a week ago and diagnosed with possible sinusitis. She states she subsequently improved, but began experiencing symptoms again in the past week. Pt denies any fevers or chills, headache, dizziness or light headedness,SOB, n/v/d/c, abd pain, back pain, dysuria, hematuria, frequency, focal weakness/numbness/tingling, or rash. Patient has no other complaints at this time. PCP:  Sabiha Pierce MD 
 
 
 
Current Outpatient Medications Medication Sig Dispense Refill  amoxicillin 500 mg tab Take 500 mg by mouth three (3) times daily for 10 days. 30 Tab 0  
 benzonatate (TESSALON PERLES) 100 mg capsule Take 1 Cap by mouth three (3) times daily as needed for Cough for up to 7 days. 30 Cap 0  
 oxymetazoline (AFRIN, OXYMETAZOLINE,) 0.05 % nasal spray 2 Sprays by Both Nostrils route two (2) times a day for 3 days. 5 mL 0  
 fluticasone (FLONASE) 50 mcg/actuation nasal spray 2 Sprays by Both Nostrils route daily. 1 Bottle 0  
 ondansetron (ZOFRAN ODT) 4 mg disintegrating tablet Take 1 Tab by mouth every eight (8) hours as needed for Nausea.  10 Tab 0  
 
 
 Past History Past Medical History: 
Past Medical History:  
Diagnosis Date  Asthma  High cholesterol Past Surgical History: 
Past Surgical History:  
Procedure Laterality Date  HX CERVICAL DISKECTOMY  HX CYST REMOVAL    
 left hand  HX HERNIA REPAIR    
 HX HYSTERECTOMY  HX OTHER SURGICAL    
 eye stye removal  
 
 
Family History: 
History reviewed. No pertinent family history. Social History: 
Social History Tobacco Use  Smoking status: Current Every Day Smoker  Smokeless tobacco: Never Used Substance Use Topics  Alcohol use: Yes Comment: occasional  
 Drug use: Not on file Allergies: Allergies Allergen Reactions  Bupropion Hcl Rash  Wellbutrin [Bupropion] Rash and Itching Review of Systems Review of Systems Constitutional: Negative for chills and fever. HENT: Positive for congestion, rhinorrhea and sinus pressure. Negative for sore throat. Respiratory: Positive for cough. Negative for shortness of breath. Cardiovascular: Positive for chest pain. Gastrointestinal: Negative for abdominal pain, blood in stool, constipation, diarrhea, nausea and vomiting. Genitourinary: Negative for dysuria, frequency and hematuria. Musculoskeletal: Positive for myalgias. Negative for back pain. Skin: Negative for rash and wound. Neurological: Negative for dizziness and headaches. All other systems reviewed and are negative. Physical Exam  
 
Visit Vitals /65 (BP 1 Location: Left arm, BP Patient Position: At rest) Pulse 67 Temp 98.6 °F (37 °C) Resp 16 Ht 5' 5.5\" (1.664 m) Wt 76.7 kg (169 lb) SpO2 100% BMI 27.70 kg/m² Physical Exam  
Constitutional: She is oriented to person, place, and time. She appears well-developed and well-nourished. No distress. HENT:  
Head: Normocephalic and atraumatic. Bilateral nasal turbinates are pale and edematous.   Serous effusion noted to right TM. Left TM bulging with purulence and erythema. Unremarkable posterior oropharynx. Eyes: Conjunctivae are normal. Right eye exhibits no discharge. Left eye exhibits no discharge. Neck: Normal range of motion. Neck supple. No thyromegaly present. Cardiovascular: Normal rate, regular rhythm and normal heart sounds. Pulmonary/Chest: Effort normal and breath sounds normal. No respiratory distress. She has no wheezes. She has no rales. She exhibits no tenderness. Lungs CTAB Musculoskeletal: She exhibits no edema or deformity. Lymphadenopathy:  
  She has no cervical adenopathy. Neurological: She is alert and oriented to person, place, and time. She has normal reflexes. Skin: Skin is warm and dry. She is not diaphoretic. Psychiatric: She has a normal mood and affect. Nursing note and vitals reviewed. Diagnostic Study Results Labs - No results found for this or any previous visit (from the past 12 hour(s)). Radiologic Studies - Xr Chest Pa Lat Result Date: 2/7/2019 EXAM: CHEST PA AND LATERAL CLINICAL HISTORY/INDICATION:  SOB , cough, chest congestion, smoker, history of asthma COMPARISON: Chest x-ray September 25, 2018. TECHNIQUE: PA and lateral views FINDINGS:  The cardiac and mediastinal silhouette is normal.  The lungs are clear. Small bulla in the apices. The costophrenic angles are sharply defined. Pulmonary vascularity is normal. Lower anterior cervical spine hardware. IMPRESSION: No acute finding. Tiny apical bulla unchanged. Medical Decision Making I am the first provider for this patient. I reviewed the vital signs, available nursing notes, past medical history, past surgical history, family history and social history. Vital Signs-Reviewed the patient's vital signs. Pulse Oximetry Analysis -  100% on room air (Interpretation) Records Reviewed: Nursing Notes and Old Medical Records (Time of Review: 2:31 PM) ED Course: Progress Notes, Reevaluation, and Consults: 
 
Provider Notes (Medical Decision Making):  
Differential Diagnosis: sinusitis, influenza, URI, pneumonia, asthma exacerbation Plan:Pt presents in NAD, vitals wnl. Exam and HPI c/w allergic rhinitis, and AOM on left. CXR negative. Will treat with abx, afrin, flonase, and tessalon. At this time, patient is stable and appropriate for discharge home. Patient demonstrates understanding of current diagnoses and is in agreement with the treatment plan. They are advised that while the likelihood of serious underlying condition is low at this point given the evaluation performed today, we cannot fully rule it out. They are advised to immediately return with any new symptoms or worsening of current condition. All questions have been answered. Patient is given educational material regarding their diagnoses, including danger symptoms and when to return to the ED. Diagnosis Clinical Impression: 1. Other acute nonsuppurative otitis media of left ear, recurrence not specified 2. Allergic rhinitis, unspecified seasonality, unspecified trigger 3. Musculoskeletal chest pain Disposition: [facility] Follow-up Information Follow up With Specialties Details Why Contact Info Khushboo Adame MD Internal Medicine Call in 2 days  [address] [address] 
[phone] [address] [facility] EMERGENCY DEPT Emergency Medicine Go to As needed, If symptoms worsen [place] [id number] [phone] Medication List  
  
START taking these medications   
amoxicillin 500 mg Tab Take 500 mg by mouth three (3) times daily for 10 days. benzonatate 100 mg capsule Commonly known as:  TESSALON PERLES Take 1 Cap by mouth three (3) times daily as needed for Cough for up to 7 days. fluticasone 50 mcg/actuation nasal spray Commonly known as:  Pricilla Benito 2 Sprays by Both Nostrils route daily. oxymetazoline 0.05 % nasal spray Commonly known as:  AFRIN (OXYMETAZOLINE) 2 Sprays by Both Nostrils route two (2) times a day for 3 days. ASK your doctor about these medications   
ondansetron 4 mg disintegrating tablet Commonly known as:  ZOFRAN ODT Take 1 Tab by mouth every eight (8) hours as needed for Nausea. Where to Get Your Medications Information about where to get these medications is not yet available Ask your nurse or doctor about these medications · amoxicillin 500 mg Tab · benzonatate 100 mg capsule · fluticasone 50 mcg/actuation nasal spray 
· oxymetazoline 0.05 % nasal spray 
  
 
_______________________________

## 2019-02-07 NOTE — ED NOTES
Sharon Joseph is a 50 y.o. female that was discharged in stable condition. The patients diagnosis, condition and treatment were explained to  patient and aftercare instructions were given. The patient verbalized understanding. Patient armband removed and shredded.

## 2019-02-07 NOTE — DISCHARGE INSTRUCTIONS
Patient Education      Please return immediately to the Emergency Room for re-evaluation if you are not improving, develop any new symptoms, or develop worsening of current symptoms! If you have been prescribed a medication and are unable to take this medication for any reason, please return to the Emergency Department for further evaluation! If you have been referred for follow-up to a specialist, but are unable to follow-up and your symptoms are either not improving or are worsening, please return to the Emergency Department for further evaluation! One or more of your blood pressure readings were elevated during this ER visit. Please keep a close watch your blood pressure by checking it regularly and follow-up with your primary doctor for further evaluation and possible treatment. If you are prescribed blood pressure medication, make sure you take it as prescribed. Untreated high blood pressure can lead to many serious health conditions including, but not limited to, stroke, heart failure, kidney failure. Allergies: Care Instructions  Your Care Instructions    Allergies occur when your body's defense system (immune system) overreacts to certain substances. The immune system treats a harmless substance as if it were a harmful germ or virus. Many things can cause this overreaction, including pollens, medicine, food, dust, animal dander, and mold. Allergies can be mild or severe. Mild allergies can be managed with home treatment. But medicine may be needed to prevent problems. Managing your allergies is an important part of staying healthy. Your doctor may suggest that you have allergy testing to help find out what is causing your allergies. When you know what things trigger your symptoms, you can avoid them. This can prevent allergy symptoms and other health problems.   For severe allergies that cause reactions that affect your whole body (anaphylactic reactions), your doctor may prescribe a shot of epinephrine to carry with you in case you have a severe reaction. Learn how to give yourself the shot and keep it with you at all times. Make sure it is not . Follow-up care is a key part of your treatment and safety. Be sure to make and go to all appointments, and call your doctor if you are having problems. It's also a good idea to know your test results and keep a list of the medicines you take. How can you care for yourself at home? · If you have been told by your doctor that dust or dust mites are causing your allergy, decrease the dust around your bed:  ? Wash sheets, pillowcases, and other bedding in hot water every week. ? Use dust-proof covers for pillows, duvets, and mattresses. Avoid plastic covers because they tear easily and do not \"breathe. \" Wash as instructed on the label. ? Do not use any blankets and pillows that you do not need. ? Use blankets that you can wash in your washing machine. ? Consider removing drapes and carpets, which attract and hold dust, from your bedroom. · If you are allergic to house dust and mites, do not use home humidifiers. Your doctor can suggest ways you can control dust and mites. · Look for signs of cockroaches. Cockroaches cause allergic reactions. Use cockroach baits to get rid of them. Then, clean your home well. Cockroaches like areas where grocery bags, newspapers, empty bottles, or cardboard boxes are stored. Do not keep these inside your home, and keep trash and food containers sealed. Seal off any spots where cockroaches might enter your home. · If you are allergic to mold, get rid of furniture, rugs, and drapes that smell musty. Check for mold in the bathroom. · If you are allergic to outdoor pollen or mold spores, use air-conditioning. Change or clean all filters every month. Keep windows closed. · If you are allergic to pollen, stay inside when pollen counts are high.  Use a vacuum  with a HEPA filter or a double-thickness filter at least two times each week. · Stay inside when air pollution is bad. Avoid paint fumes, perfumes, and other strong odors. · Avoid conditions that make your allergies worse. Stay away from smoke. Do not smoke or let anyone else smoke in your house. Do not use fireplaces or wood-burning stoves. · If you are allergic to your pets, change the air filter in your furnace every month. Use high-efficiency filters. · If you are allergic to pet dander, keep pets outside or out of your bedroom. Old carpet and cloth furniture can hold a lot of animal dander. You may need to replace them. When should you call for help? Give an epinephrine shot if:    · You think you are having a severe allergic reaction.     · You have symptoms in more than one body area, such as mild nausea and an itchy mouth.    After giving an epinephrine shot call 911, even if you feel better.   Call 911 if:    · You have symptoms of a severe allergic reaction. These may include:  ? Sudden raised, red areas (hives) all over your body. ? Swelling of the throat, mouth, lips, or tongue. ? Trouble breathing. ? Passing out (losing consciousness). Or you may feel very lightheaded or suddenly feel weak, confused, or restless.     · You have been given an epinephrine shot, even if you feel better.    Call your doctor now or seek immediate medical care if:    · You have symptoms of an allergic reaction, such as:  ? A rash or hives (raised, red areas on the skin). ? Itching. ? Swelling. ? Belly pain, nausea, or vomiting.    Watch closely for changes in your health, and be sure to contact your doctor if:    · You do not get better as expected. Where can you learn more? Go to http://nedra-susana.info/. Enter P138 in the search box to learn more about \"Allergies: Care Instructions. \"  Current as of: June 27, 2018  Content Version: 11.9  © 2255-1557 [employer].  Care instructions adapted under license by [employer] (which disclaims liability or warranty for this information). If you have questions about a medical condition or this instruction, always ask your healthcare professional. [address] any warranty or liability for your use of this information. Patient Education        Using a Nasal Steroid Spray: Care Instructions  Your Care Instructions    Your doctor may suggest using a corticosteroid nasal spray for your allergy symptoms or sinus problems. These sprays reduce the swelling inside the nose and sinuses. Unlike decongestant nasal sprays, steroid sprays won't lead to more swelling when you stop taking them. These sprays start working in a few days, but it may take several weeks before you get the full effect. Most side effects are minor. The most common complaint is a burning feeling in the nose right after the spray is used. Some people get nosebleeds. Follow-up care is a key part of your treatment and safety. Be sure to make and go to all appointments, and call your doctor if you are having problems. It's also a good idea to know your test results and keep a list of the medicines you take. How can you care for yourself at home? Here are some tips for using these sprays:  · You may need to prime the sprayer before you use it. This means spraying it into the air a few times to make sure you get the right amount of medicine. Follow the directions on the label. · Blow your nose before you spray. This will help clear out your nostrils. · Gently sniff the medicine into your nose as you spray. Don't snort, or the medicine will go all the way into your throat where it won't do much good. · Aim the nozzle straight toward the outer wall of your nostril. This will help keep the medicine from irritating the inner walls of your nose, especially your septum (the wall that separates your left and right nostrils). · Don't blow your nose for 10 minutes or so after you spray.  And try not to sneeze. · Be safe with medicines. Use this medicine exactly as prescribed. Call your doctor if you think you are having a problem with your medicine. · Clean your sprayer once a week. Read the label to learn how. When should you call for help? Watch closely for changes in your health, and be sure to contact your doctor if you have any problems. Where can you learn more? Go to http://nedra-susana.info/. Enter N339 in the search box to learn more about \"Using a Nasal Steroid Spray: Care Instructions. \"  Current as of: March 27, 2018  Content Version: 11.9  © 0345-1170 [employer]. Care instructions adapted under license by [employer] (which disclaims liability or warranty for this information). If you have questions about a medical condition or this instruction, always ask your healthcare professional. [address] any warranty or liability for your use of this information. Patient Education        Musculoskeletal Chest Pain: Care Instructions  Your Care Instructions    Chest pain is not always a sign that something is wrong with your heart or that you have another serious problem. The doctor thinks your chest pain is caused by strained muscles or ligaments, inflamed chest cartilage, or another problem in your chest, rather than by your heart. You may need more tests to find the cause of your chest pain. Follow-up care is a key part of your treatment and safety. Be sure to make and go to all appointments, and call your doctor if you are having problems. It's also a good idea to know your test results and keep a list of the medicines you take. How can you care for yourself at home? · Take pain medicines exactly as directed. ? If the doctor gave you a prescription medicine for pain, take it as prescribed. ? If you are not taking a prescription pain medicine, ask your doctor if you can take an over-the-counter medicine.   · Rest and protect the sore area. · Stop, change, or take a break from any activity that may be causing your pain or soreness. · Put ice or a cold pack on the sore area for 10 to 20 minutes at a time. Try to do this every 1 to 2 hours for the next 3 days (when you are awake) or until the swelling goes down. Put a thin cloth between the ice and your skin. · After 2 or 3 days, apply a heating pad set on low or a warm cloth to the area that hurts. Some doctors suggest that you go back and forth between hot and cold. · Do not wrap or tape your ribs for support. This may cause you to take smaller breaths, which could increase your risk of lung problems. · Mentholated creams such as Bengay or Icy Hot may soothe sore muscles. Follow the instructions on the package. · Follow your doctor's instructions for exercising. · Gentle stretching and massage may help you get better faster. Stretch slowly to the point just before pain begins, and hold the stretch for at least 15 to 30 seconds. Do this 3 or 4 times a day. Stretch just after you have applied heat. · As your pain gets better, slowly return to your normal activities. Any increased pain may be a sign that you need to rest a while longer. When should you call for help? Call 911 anytime you think you may need emergency care. For example, call if:    · You have chest pain or pressure. This may occur with:  ? Sweating. ? Shortness of breath. ? Nausea or vomiting. ? Pain that spreads from the chest to the neck, jaw, or one or both shoulders or arms. ? Dizziness or lightheadedness. ? A fast or uneven pulse. After calling 911, chew 1 adult-strength aspirin. Wait for an ambulance.  Do not try to drive yourself.     · You have sudden chest pain and shortness of breath, or you cough up blood.    Call your doctor now or seek immediate medical care if:    · You have any trouble breathing.     · Your chest pain gets worse.     · Your chest pain occurs consistently with exercise and is relieved by rest.    Watch closely for changes in your health, and be sure to contact your doctor if:    · Your chest pain does not get better after 1 week. Where can you learn more? Go to http://nedra-susana.info/. Enter V293 in the search box to learn more about \"Musculoskeletal Chest Pain: Care Instructions. \"  Current as of: September 23, 2018  Content Version: 11.9  © 4339-7445 [employer]. Care instructions adapted under license by [employer] (which disclaims liability or warranty for this information). If you have questions about a medical condition or this instruction, always ask your healthcare professional. [address] any warranty or liability for your use of this information.

## 2019-02-07 NOTE — LETTER
NOTIFICATION OF RETURN TO WORK 
 
2/7/2019 2:27 PM 
 
Ms. Rivka Quiñones [address] [facility] [address] Kellie Maldonado To Whom It May Concern: 
 
Rivka Quiñones was under the care of [id number] [facility] EMERGENCY DEPT. She will be able to return to work on Monday, 2/11/19. If there are questions or concerns please have the patient contact our office. Sincerely, Francheska Lucas PA-C

## 2019-02-07 NOTE — ED TRIAGE NOTES
Patient states being recently dx with Sinus infection. She states developing cough and left lateral rib pain two days ago.

## 2021-11-01 ENCOUNTER — HOSPITAL ENCOUNTER (EMERGENCY)
Age: 51
Discharge: HOME | End: 2021-11-01
Attending: STUDENT IN AN ORGANIZED HEALTH CARE EDUCATION/TRAINING PROGRAM
Payer: COMMERCIAL

## 2021-11-01 VITALS
HEART RATE: 114 BPM | TEMPERATURE: 97.9 F | DIASTOLIC BLOOD PRESSURE: 74 MMHG | HEIGHT: 65 IN | OXYGEN SATURATION: 99 % | WEIGHT: 135 LBS | BODY MASS INDEX: 22.49 KG/M2 | RESPIRATION RATE: 18 BRPM | SYSTOLIC BLOOD PRESSURE: 100 MMHG

## 2021-11-01 DIAGNOSIS — R53.83 MALAISE AND FATIGUE: ICD-10-CM

## 2021-11-01 DIAGNOSIS — R10.84 ABDOMINAL PAIN, GENERALIZED: Primary | ICD-10-CM

## 2021-11-01 DIAGNOSIS — E86.0 DEHYDRATION: ICD-10-CM

## 2021-11-01 DIAGNOSIS — E87.1 HYPONATREMIA: ICD-10-CM

## 2021-11-01 DIAGNOSIS — R53.81 MALAISE AND FATIGUE: ICD-10-CM

## 2021-11-01 DIAGNOSIS — R53.1 WEAKNESS: ICD-10-CM

## 2021-11-01 LAB
ALBUMIN SERPL-MCNC: 3.1 G/DL (ref 3.4–5)
ALBUMIN/GLOB SERPL: 0.6 {RATIO} (ref 0.8–1.7)
ALP SERPL-CCNC: 271 U/L (ref 45–117)
ALT SERPL-CCNC: 48 U/L (ref 13–56)
ANION GAP SERPL CALC-SCNC: 4 MMOL/L (ref 3–18)
AST SERPL-CCNC: 42 U/L (ref 10–38)
ATRIAL RATE: 99 BPM
BASOPHILS # BLD: 0 K/UL (ref 0–0.1)
BASOPHILS NFR BLD: 0 % (ref 0–2)
BILIRUB SERPL-MCNC: 1.6 MG/DL (ref 0.2–1)
BUN SERPL-MCNC: 28 MG/DL (ref 7–18)
BUN/CREAT SERPL: 20 (ref 12–20)
CALCIUM SERPL-MCNC: 9.5 MG/DL (ref 8.5–10.1)
CALCULATED P AXIS, ECG09: 54 DEGREES
CALCULATED R AXIS, ECG10: 33 DEGREES
CALCULATED T AXIS, ECG11: 49 DEGREES
CHLORIDE SERPL-SCNC: 94 MMOL/L (ref 100–111)
CO2 SERPL-SCNC: 28 MMOL/L (ref 21–32)
CREAT SERPL-MCNC: 1.4 MG/DL (ref 0.6–1.3)
DIAGNOSIS, 93000: NORMAL
DIFFERENTIAL METHOD BLD: ABNORMAL
EOSINOPHIL # BLD: 0.1 K/UL (ref 0–0.4)
EOSINOPHIL NFR BLD: 1 % (ref 0–5)
ERYTHROCYTE [DISTWIDTH] IN BLOOD BY AUTOMATED COUNT: 12.9 % (ref 11.6–14.5)
GLOBULIN SER CALC-MCNC: 4.8 G/DL (ref 2–4)
GLUCOSE SERPL-MCNC: 102 MG/DL (ref 74–99)
HCT VFR BLD AUTO: 32.9 % (ref 35–45)
HGB BLD-MCNC: 11.2 G/DL (ref 12–16)
LIPASE SERPL-CCNC: 126 U/L (ref 73–393)
LYMPHOCYTES # BLD: 1.3 K/UL (ref 0.9–3.6)
LYMPHOCYTES NFR BLD: 14 % (ref 21–52)
MAGNESIUM SERPL-MCNC: 2.5 MG/DL (ref 1.6–2.6)
MCH RBC QN AUTO: 28.7 PG (ref 24–34)
MCHC RBC AUTO-ENTMCNC: 34 G/DL (ref 31–37)
MCV RBC AUTO: 84.4 FL (ref 78–100)
MONOCYTES # BLD: 0.7 K/UL (ref 0.05–1.2)
MONOCYTES NFR BLD: 8 % (ref 3–10)
NEUTS SEG # BLD: 7 K/UL (ref 1.8–8)
NEUTS SEG NFR BLD: 76 % (ref 40–73)
P-R INTERVAL, ECG05: 124 MS
PLATELET # BLD AUTO: 363 K/UL (ref 135–420)
PMV BLD AUTO: 9.9 FL (ref 9.2–11.8)
POTASSIUM SERPL-SCNC: 3.9 MMOL/L (ref 3.5–5.5)
PROT SERPL-MCNC: 7.9 G/DL (ref 6.4–8.2)
Q-T INTERVAL, ECG07: 352 MS
QRS DURATION, ECG06: 84 MS
QTC CALCULATION (BEZET), ECG08: 451 MS
RBC # BLD AUTO: 3.9 M/UL (ref 4.2–5.3)
SODIUM SERPL-SCNC: 126 MMOL/L (ref 136–145)
TROPONIN I SERPL-MCNC: <0.02 NG/ML (ref 0–0.04)
VENTRICULAR RATE, ECG03: 99 BPM
WBC # BLD AUTO: 9.3 K/UL (ref 4.6–13.2)

## 2021-11-01 PROCEDURE — 83735 ASSAY OF MAGNESIUM: CPT

## 2021-11-01 PROCEDURE — 80053 COMPREHEN METABOLIC PANEL: CPT

## 2021-11-01 PROCEDURE — 74011250636 HC RX REV CODE- 250/636: Performed by: STUDENT IN AN ORGANIZED HEALTH CARE EDUCATION/TRAINING PROGRAM

## 2021-11-01 PROCEDURE — 96375 TX/PRO/DX INJ NEW DRUG ADDON: CPT

## 2021-11-01 PROCEDURE — 99283 EMERGENCY DEPT VISIT LOW MDM: CPT

## 2021-11-01 PROCEDURE — 96361 HYDRATE IV INFUSION ADD-ON: CPT

## 2021-11-01 PROCEDURE — 83690 ASSAY OF LIPASE: CPT

## 2021-11-01 PROCEDURE — 96374 THER/PROPH/DIAG INJ IV PUSH: CPT

## 2021-11-01 PROCEDURE — 93005 ELECTROCARDIOGRAM TRACING: CPT

## 2021-11-01 PROCEDURE — 85025 COMPLETE CBC W/AUTO DIFF WBC: CPT

## 2021-11-01 PROCEDURE — 84484 ASSAY OF TROPONIN QUANT: CPT

## 2021-11-01 RX ORDER — ONDANSETRON 4 MG/1
4 TABLET, ORALLY DISINTEGRATING ORAL
Qty: 20 TABLET | Refills: 0 | Status: SHIPPED | OUTPATIENT
Start: 2021-11-01

## 2021-11-01 RX ORDER — ONDANSETRON 2 MG/ML
4 INJECTION INTRAMUSCULAR; INTRAVENOUS
Status: COMPLETED | OUTPATIENT
Start: 2021-11-01 | End: 2021-11-01

## 2021-11-01 RX ORDER — HYDROMORPHONE HYDROCHLORIDE 1 MG/ML
1 INJECTION, SOLUTION INTRAMUSCULAR; INTRAVENOUS; SUBCUTANEOUS ONCE
Status: COMPLETED | OUTPATIENT
Start: 2021-11-01 | End: 2021-11-01

## 2021-11-01 RX ADMIN — HYDROMORPHONE HYDROCHLORIDE 1 MG: 1 INJECTION, SOLUTION INTRAMUSCULAR; INTRAVENOUS; SUBCUTANEOUS at 08:36

## 2021-11-01 RX ADMIN — ONDANSETRON 4 MG: 2 INJECTION INTRAMUSCULAR; INTRAVENOUS at 08:37

## 2021-11-01 RX ADMIN — SODIUM CHLORIDE 1000 ML: 900 INJECTION, SOLUTION INTRAVENOUS at 08:37

## 2021-11-01 RX ADMIN — SODIUM CHLORIDE 1000 ML: 900 INJECTION, SOLUTION INTRAVENOUS at 10:11

## 2021-11-01 NOTE — ED PROVIDER NOTES
EMERGENCY DEPARTMENT HISTORY AND PHYSICAL EXAM      Date: 11/1/2021  Patient Name: Bishop Lou    History of Presenting Illness     Chief Complaint   Patient presents with    Vomiting       History (Context): Bishop Lou is a 46 y.o. female with a past medical history significant for asthma, pancreatic cancer, and hyperlipidemia comes into the ED today due to nausea, vomiting, diarrhea, and abdominal pain. Patient states symptoms have been ongoing for the past few days. Patient states symptoms have worsened since onset. She currently is not on any chemotherapy for treatment of her pancreatic cancer. She admits to multiple episodes of emesis per day and is unable to keep down any fluids or food. She admits to multiple episodes of diarrhea as well over the past day. She denies any fever, chest pain, dyspnea, numbness, tingling, or unilateral weakness. She does admit to fatigue, decreased activity, and chills. She has been taking her at home pain medication for treatment of her symptoms as well as Zofran however without alleviation. She describes her symptoms as severe. PCP: Donald Lewis MD    Current Facility-Administered Medications   Medication Dose Route Frequency Provider Last Rate Last Admin    sodium chloride 0.9 % bolus infusion 1,000 mL  1,000 mL IntraVENous ONCE Mann Fernandez, DO        HYDROmorphone (DILAUDID) injection 1 mg  1 mg IntraVENous ONCE Mann Fernandez DO        ondansetron (ZOFRAN) injection 4 mg  4 mg IntraVENous NOW Joel Mercado DO         Current Outpatient Medications   Medication Sig Dispense Refill    fluticasone (FLONASE) 50 mcg/actuation nasal spray 2 Sprays by Both Nostrils route daily. 1 Bottle 0    ondansetron (ZOFRAN ODT) 4 mg disintegrating tablet Take 1 Tab by mouth every eight (8) hours as needed for Nausea.  10 Tab 0       Past History     Past Medical History:   Past Medical History:   Diagnosis Date    Asthma     High cholesterol Past Surgical History:  Past Surgical History:   Procedure Laterality Date    HX CERVICAL DISKECTOMY      HX CYST REMOVAL      left hand    HX HERNIA REPAIR      HX HYSTERECTOMY      HX OTHER SURGICAL      eye stye removal       Family History:  No family history on file. Social History:   Social History     Tobacco Use    Smoking status: Current Every Day Smoker    Smokeless tobacco: Never Used   Substance Use Topics    Alcohol use: Yes     Comment: occasional    Drug use: Not on file       Allergies: Allergies   Allergen Reactions    Bupropion Hcl Rash    Wellbutrin [Bupropion] Rash and Itching       PMH, PSH, family history, social history, allergies reviewed with the patient with significant items noted above. Review of Systems   Review of Systems   Constitutional: Positive for activity change, appetite change, chills and fatigue. Negative for fever. HENT: Negative for sore throat. Eyes: Negative for visual disturbance. Respiratory: Negative for shortness of breath. Cardiovascular: Negative for chest pain. Gastrointestinal: Positive for abdominal pain, diarrhea, nausea and vomiting. Genitourinary: Negative for difficulty urinating. Musculoskeletal: Negative for myalgias. Skin: Negative for rash. Neurological: Negative for headaches. Physical Exam     Vitals:    11/01/21 0804   BP: 100/74   Pulse: (!) 114   Resp: 18   Temp: 97.9 °F (36.6 °C)   SpO2: 99%   Weight: 61.2 kg (135 lb)   Height: 5' 5\" (1.651 m)       Physical Exam  Vitals and nursing note reviewed. Constitutional:       General: She is not in acute distress. Appearance: She is ill-appearing. She is not toxic-appearing. Comments: Fatigued     HENT:      Head: Normocephalic and atraumatic. Mouth/Throat:      Mouth: Mucous membranes are moist.   Eyes:      General: No scleral icterus. Conjunctiva/sclera: Conjunctivae normal.   Cardiovascular:      Rate and Rhythm: Regular rhythm. Tachycardia present. Pulses: Normal pulses. Comments: Normal peripheral perfusion  Pulmonary:      Effort: Pulmonary effort is normal. No respiratory distress. Abdominal:      General: There is distension (mild generalized distension). Tenderness: There is no abdominal tenderness. There is no guarding or rebound. Comments: Mildly firm to palpation     Musculoskeletal:         General: No deformity. Normal range of motion. Cervical back: Normal range of motion and neck supple. Skin:     General: Skin is warm and dry. Findings: No rash. Neurological:      General: No focal deficit present. Mental Status: She is alert and oriented to person, place, and time. Mental status is at baseline. Cranial Nerves: No cranial nerve deficit. Sensory: No sensory deficit. Motor: Weakness (Generalized) present. Gait: Gait normal.   Psychiatric:         Mood and Affect: Mood normal.         Thought Content: Thought content normal.         Diagnostic Study Results     Labs -   No results found for this or any previous visit (from the past 12 hour(s)). Labs Reviewed   CBC WITH AUTOMATED DIFF   METABOLIC PANEL, COMPREHENSIVE   LIPASE   MAGNESIUM   TROPONIN I       Radiologic Studies -   No orders to display     CT Results  (Last 48 hours)    None        CXR Results  (Last 48 hours)    None          The laboratory results, imaging results, and other diagnostic exams were reviewed in the EMR. Medical Decision Making   I am the first provider for this patient. I reviewed the vital signs, available nursing notes, past medical history, past surgical history, family history and social history. Vital Signs-Reviewed the patient's vital signs. ED EKG interpretation:  Rhythm: normal sinus rhythm; and regular . Rate (approx.): 99; Axis: normal; P wave: normal; QRS interval: normal ; ST/T wave: non-specific changes; Other findings: borderline ekg.  This EKG was interpreted by Gertrudis Ryan D.O. Records Reviewed: Personally, on initial evaluation    MDM:   Reshma Casiano presents with complaint of nausea, vomiting, diarrhea, abdominal pain, and generalized weakness  DDX includes but is not limited to: Dehydration, electrolyte abnormality, vomiting    Patient overall ill-appearing but in no acute distress and vital signs grossly within normal limits with exception to tachycardia. Suspect patient dehydrated at this time based off of history and physical exam.  Will obtain lab work for further evaluation of patients complaint. Will continue to monitor and evaluate patient while in the ED. Orders as below:  Orders Placed This Encounter    CBC WITH AUTOMATED DIFF    METABOLIC PANEL, COMPREHENSIVE    LIPASE    MAGNESIUM    TROPONIN I    EKG, 12 LEAD, INITIAL    sodium chloride 0.9 % bolus infusion 1,000 mL    HYDROmorphone (DILAUDID) injection 1 mg    ondansetron [facility]) injection 4 mg        ED Course:   ED Course as of Nov 01 1148   Mon Nov 01, 2021   0904 Patient's lab work significant for sodium 126, creatinine 1.40 from baseline around 1.0, elevated AST and alk phos, hemoglobin 11.2, otherwise labs grossly within normal limits. We will continue to monitor patient. [DV]   1002 She states mild improvement of her symptoms. Provided shared decision-making with patient who states she will attempt 1 more bag of IV fluids as well as p.o. challenge at this time. Based on how she feels following will determine disposition for admission versus discharge. We will continue to monitor patient. [DV]   0558 Patient states she continues to feel mild improvement of her symptoms however now feeling full. Provided shared decision making with patient about admission versus discharge. Patient states that she would like to be discharged at this time. Will prescribe patient Zofran for further treatment of her symptoms.   Provided patient with strict return precautions and follow-up recommendations. Patient verbalized understanding and is without any further questions. [DV]      ED Course User Index  [DV] Yee Grimes DO           Procedures:  Procedures        Diagnosis and Disposition     CLINICAL IMPRESSION:  No diagnosis found. Current Discharge Medication List          Disposition: Home    Patient condition at time of disposition: Stable    DISCHARGE NOTE:   Pt has been reexamined. Patient has no new complaints, changes, or physical findings. Care plan outlined and precautions discussed. Results were reviewed with the patient. All medications were reviewed with the patient. All of pt's questions and concerns were addressed. Alarm symptoms and return precautions associated with chief complaint and evaluation were reviewed with the patient in detail. The patient demonstrated adequate understanding. Patient was instructed to follow up with PCP, Oncology, as well as strict return precautions to the ED upon further deterioration. Patient is ready to go home. The patient is happy with this plan          Dragon Disclaimer     Please note that this dictation was completed with [employer], the computer voice recognition software. Quite often unanticipated grammatical, syntax, homophones, and other interpretive errors are inadvertently transcribed by the computer software. Please disregard these errors. Please excuse any errors that have escaped final proofreading. Guera PEREZ

## 2021-11-01 NOTE — ED NOTES
Pt is seen by [address] [place] [address]) and was sent to ED today for vomiting and weakness possible dehydration. Pt has a history of pancreatic cancer with mets to the liver.   Notes will be faxed